# Patient Record
Sex: MALE | Race: BLACK OR AFRICAN AMERICAN | NOT HISPANIC OR LATINO | Employment: FULL TIME | URBAN - METROPOLITAN AREA
[De-identification: names, ages, dates, MRNs, and addresses within clinical notes are randomized per-mention and may not be internally consistent; named-entity substitution may affect disease eponyms.]

---

## 2017-11-13 ENCOUNTER — OFFICE VISIT (OUTPATIENT)
Dept: FAMILY MEDICINE | Facility: CLINIC | Age: 42
End: 2017-11-13
Payer: OTHER GOVERNMENT

## 2017-11-13 VITALS
HEART RATE: 107 BPM | SYSTOLIC BLOOD PRESSURE: 140 MMHG | TEMPERATURE: 100 F | BODY MASS INDEX: 46.01 KG/M2 | WEIGHT: 303.56 LBS | DIASTOLIC BLOOD PRESSURE: 92 MMHG | HEIGHT: 68 IN | OXYGEN SATURATION: 98 %

## 2017-11-13 DIAGNOSIS — J06.9 UPPER RESPIRATORY TRACT INFECTION, UNSPECIFIED TYPE: Primary | ICD-10-CM

## 2017-11-13 DIAGNOSIS — R09.82 PND (POST-NASAL DRIP): ICD-10-CM

## 2017-11-13 PROCEDURE — 99203 OFFICE O/P NEW LOW 30 MIN: CPT | Mod: S$PBB,,, | Performed by: FAMILY MEDICINE

## 2017-11-13 PROCEDURE — 99213 OFFICE O/P EST LOW 20 MIN: CPT | Mod: PBBFAC,PO | Performed by: FAMILY MEDICINE

## 2017-11-13 PROCEDURE — 99999 PR PBB SHADOW E&M-EST. PATIENT-LVL III: CPT | Mod: PBBFAC,,, | Performed by: FAMILY MEDICINE

## 2017-11-13 RX ORDER — LORATADINE 10 MG/1
10 TABLET ORAL DAILY
Qty: 30 TABLET | Refills: 2 | Status: SHIPPED | OUTPATIENT
Start: 2017-11-13

## 2017-11-13 RX ORDER — GLIMEPIRIDE 4 MG/1
TABLET ORAL
Refills: 3 | COMMUNITY
Start: 2017-08-30

## 2017-11-13 RX ORDER — OSELTAMIVIR PHOSPHATE 75 MG/1
75 CAPSULE ORAL 2 TIMES DAILY
Qty: 10 CAPSULE | Refills: 0 | Status: SHIPPED | OUTPATIENT
Start: 2017-11-13 | End: 2017-11-18

## 2017-11-13 RX ORDER — ALLOPURINOL 100 MG/1
100 TABLET ORAL DAILY
COMMUNITY

## 2017-11-13 RX ORDER — CIPROFLOXACIN 500 MG/1
TABLET ORAL
Refills: 0 | COMMUNITY
Start: 2017-09-12

## 2017-11-13 NOTE — PROGRESS NOTES
Office Visit    Patient Name: Rodger Cruz    : 1975  MRN: [id number]      Assessment/Plan:  Rodger Cruz is a 42 y.o. male who presents today for :    Upper respiratory tract infection, unspecified type  -     oseltamivir (TAMIFLU) 75 MG capsule; Take 1 capsule (75 mg total) by mouth 2 (two) times daily.  Dispense: 10 capsule; Refill: 0  -     loratadine (CLARITIN) 10 mg tablet; Take 1 tablet (10 mg total) by mouth once daily.  Dispense: 30 tablet; Refill: 2    PND (post-nasal drip)        -start Tamiflu based on clinical findings  -advised cont supportive therapy and symptomatic management. May try nasal rinses.  -stressed hydration (water) and rest, as well as frequent hand washing and covering coughs to prevent spread of respiratory illnesses  -RTC if Sx worsens or call clinic back if pt has any concerns.  -     Tylenol or Ibuprofen tablets every 4-6 hours as needed for fever, headaches, sore throat, ear pain, bodyaches, and/or nasal/sinus inflammation  -      Patient has a PCP to take care of his HLD and DM.    Return for worsening Sx. Urgent care/ED precautions provided.     This note was created by combination of typed  and Dragon dictation.  Transcription errors may be present.  If there are any questions, please contact me.        ----------------------------------------------------------------------------------------------------------------------      HPI:  Rodger is a 42 y.o. male who presents today for:    URI and Fever        This patient has multiple medical diagnoses as noted below.    This patient is new to me   Patient is here today for evaluation of mm aches/joint pain, as well as subjective fever that patient has had for the past 2 days  +cough productive of clear phlegm, sinus congestion with runny nose, states that he has moderate aches and pain all over the body  He has decreased appetite but has normal fluid intake  Patient does not have a close contact with similar  "Sx  Medications tried at home: OTC meds, anti-histamines  He threw up one time this morning. Feels slightly better at this time than yesterday but still has joint pain, mostly in the knee.    No N/V/ear pain/abd pain/SOB/CP/diarrhea.  No pets/recent travels/allergies         Additional ROS    No dysphagia  No CP/SOB/palpitations/swelling  No nausea/vomiting/abd pain/blood in stool, no diarrhea, no constipation  No rashes    Patient Active Problem List   Diagnosis    Internal hemorrhoids with complication       Past Surgical History:   Procedure Laterality Date    HERNIA REPAIR      R inguinal       History reviewed. No pertinent family history.    Social History     Social History    Marital status:      Spouse name: N/A    Number of children: N/A    Years of education: N/A     Occupational History    Not on file.     Social History Main Topics    Smoking status: Never Smoker    Smokeless tobacco: Never Used    Alcohol use No    Drug use: No    Sexual activity: Not on file     Other Topics Concern    Not on file     Social History Narrative    No narrative on file       Current Medications  Medications reviewed and updated.     Allergies   Review of patient's allergies indicates:  No Known Allergies          Review of Systems  See HPI      Physical Exam  BP (!) 140/92 (BP Location: Left arm, Patient Position: Sitting, BP Method: Large (Manual))   Pulse 107   Temp 100.1 °F (37.8 °C) (Oral)   Ht 5' 8" (1.727 m)   Wt (!) 137.7 kg (303 lb 9.2 oz)   SpO2 98%   BMI 46.16 kg/m²     GEN: NAD, appears tired  HEENT: NCAT, PERRLA, EOMI, sclera clear, anicteric, TM clear bilaterally with normal light reflex, mild nasal turbinate swelling, MMM with no lesions, O/P clear with mild erythema - no tonsillar swelling/discharge, +moderate drainage, +minimal clear nasal discharge   NECK: normal, supple with midline trachea, no LAD, no thyromegaly  LUNGS: CTAB, no w/r/r, no increased work of breathing  HEART: " slightly tachycardic, normal S1 and S2, no m/r/g  ABD: s/nt/nd, NABS  SKIN: normal turgor, no rashes, no other lesions.   PSYCH: AOx3, appropriate mood and affect

## 2021-09-22 ENCOUNTER — HOSPITAL ENCOUNTER (EMERGENCY)
Facility: HOSPITAL | Age: 46
Discharge: HOME | End: 2021-09-22
Attending: EMERGENCY MEDICINE
Payer: OTHER GOVERNMENT

## 2021-09-22 VITALS
OXYGEN SATURATION: 97 % | RESPIRATION RATE: 20 BRPM | SYSTOLIC BLOOD PRESSURE: 121 MMHG | TEMPERATURE: 98 F | DIASTOLIC BLOOD PRESSURE: 74 MMHG | WEIGHT: 300.94 LBS | BODY MASS INDEX: 45.61 KG/M2 | HEART RATE: 74 BPM | HEIGHT: 68 IN

## 2021-09-22 DIAGNOSIS — M54.16 LUMBAR RADICULOPATHY, ACUTE: Primary | ICD-10-CM

## 2021-09-22 LAB
BILIRUB UR QL STRIP: NEGATIVE
CLARITY UR: CLEAR
COLOR UR: YELLOW
GLUCOSE UR QL STRIP: NEGATIVE
HGB UR QL STRIP: NEGATIVE
KETONES UR QL STRIP: NEGATIVE
LEUKOCYTE ESTERASE UR QL STRIP: NEGATIVE
NITRITE UR QL STRIP: NEGATIVE
PH UR STRIP: 6 [PH] (ref 5–8)
PROT UR QL STRIP: NEGATIVE
SP GR UR STRIP: 1.02 (ref 1–1.03)
URN SPEC COLLECT METH UR: NORMAL
UROBILINOGEN UR STRIP-ACNC: NEGATIVE EU/DL

## 2021-09-22 PROCEDURE — 99283 EMERGENCY DEPT VISIT LOW MDM: CPT

## 2021-09-22 PROCEDURE — 81003 URINALYSIS AUTO W/O SCOPE: CPT | Performed by: EMERGENCY MEDICINE
